# Patient Record
Sex: FEMALE | Employment: OTHER | URBAN - METROPOLITAN AREA
[De-identification: names, ages, dates, MRNs, and addresses within clinical notes are randomized per-mention and may not be internally consistent; named-entity substitution may affect disease eponyms.]

---

## 2018-07-17 ENCOUNTER — TRANSFERRED RECORDS (OUTPATIENT)
Dept: HEALTH INFORMATION MANAGEMENT | Facility: CLINIC | Age: 43
End: 2018-07-17

## 2018-12-18 ENCOUNTER — TRANSFERRED RECORDS (OUTPATIENT)
Dept: HEALTH INFORMATION MANAGEMENT | Facility: CLINIC | Age: 43
End: 2018-12-18

## 2019-01-10 DIAGNOSIS — R31.9 HEMATURIA: Primary | ICD-10-CM

## 2019-01-14 ENCOUNTER — OFFICE VISIT (OUTPATIENT)
Dept: UROLOGY | Facility: CLINIC | Age: 44
End: 2019-01-14
Payer: COMMERCIAL

## 2019-01-14 VITALS
HEIGHT: 62 IN | WEIGHT: 192 LBS | OXYGEN SATURATION: 99 % | DIASTOLIC BLOOD PRESSURE: 76 MMHG | SYSTOLIC BLOOD PRESSURE: 110 MMHG | BODY MASS INDEX: 35.33 KG/M2 | HEART RATE: 104 BPM

## 2019-01-14 DIAGNOSIS — R31.9 HEMATURIA, UNSPECIFIED TYPE: ICD-10-CM

## 2019-01-14 LAB
ALBUMIN UR-MCNC: NEGATIVE MG/DL
APPEARANCE UR: CLEAR
BACTERIA #/AREA URNS HPF: ABNORMAL /HPF
BILIRUB UR QL STRIP: NEGATIVE
COLOR UR AUTO: YELLOW
GLUCOSE UR STRIP-MCNC: NEGATIVE MG/DL
HGB UR QL STRIP: ABNORMAL
KETONES UR STRIP-MCNC: NEGATIVE MG/DL
LEUKOCYTE ESTERASE UR QL STRIP: NEGATIVE
NITRATE UR QL: NEGATIVE
PH UR STRIP: 7 PH (ref 5–7)
RBC #/AREA URNS AUTO: 1 /HPF (ref 0–2)
SOURCE: ABNORMAL
SP GR UR STRIP: 1.01 (ref 1–1.03)
SQUAMOUS #/AREA URNS AUTO: <1 /HPF (ref 0–1)
UROBILINOGEN UR STRIP-ACNC: 0.2 EU/DL (ref 0.2–1)
WBC #/AREA URNS AUTO: <1 /HPF (ref 0–5)

## 2019-01-14 PROCEDURE — 81015 MICROSCOPIC EXAM OF URINE: CPT | Performed by: PHYSICIAN ASSISTANT

## 2019-01-14 PROCEDURE — 99243 OFF/OP CNSLTJ NEW/EST LOW 30: CPT | Performed by: PHYSICIAN ASSISTANT

## 2019-01-14 PROCEDURE — 81003 URINALYSIS AUTO W/O SCOPE: CPT | Performed by: PHYSICIAN ASSISTANT

## 2019-01-14 RX ORDER — UBIDECARENONE 75 MG
100 CAPSULE ORAL DAILY
COMMUNITY

## 2019-01-14 ASSESSMENT — PAIN SCALES - GENERAL: PAINLEVEL: NO PAIN (0)

## 2019-01-14 ASSESSMENT — MIFFLIN-ST. JEOR: SCORE: 1476.22

## 2019-01-14 NOTE — PROGRESS NOTES
CC: Hematuria.    HPI: It is a pleasure to see Ms. Luca Messer, a 43 year old female, asked to be seen in consultation by Dr. Shah for evaluation of hematuria. This was first detected July 2017 on dipstick (1+).  Has had repeat UA and Micros x 3 (all in normal range 0-2, 0-1, 0-1).    The patient denies any gross hematuria.  Ms. Messer voids without difficulty (noticing some frequency and urgency since she started drinking cranberry juice this past month), and reports nocturia of 2-3.  She currently denies any dysuria, pyuria, hesitancy, intermittency, feelings of incomplete emptying, or any recent history of urinary tract infections or stones.    Hematuria Risk Factors:  Age >40: Yes     Smoking history: no  Occupational exposure to chemicals or dyes (ie, benzenes, aromatic amines): no  History of urologic disorder or disease: no  History of irritative voiding symptoms: no  History of urinary tract infection: no  Analgesic abuse: no  History of pelvic irradiation: no    Past Medical History:   Diagnosis Date     Thyroid disease     hypothyroid     History reviewed. No pertinent surgical history.  Current Outpatient Medications   Medication Sig Dispense Refill     Cholecalciferol (VITAMIN D3) 1000 units CAPS Take two capsules daily       Lactobacillus (CVS PROBIOTIC ACIDOPHILUS) 10 MG CAPS Take 1 capsule by mouth       levothyroxine (SYNTHROID, LEVOTHROID) 112 MCG tablet Take 112 mcg by mouth daily.       vitamin B-12 (CYANOCOBALAMIN) 100 MCG tablet Take 100 mcg by mouth daily       Prenatal Vit w/Vk-Adpifjepx-UA (PNV PO) Take 1 tablet by mouth daily.       No Known Allergies  FAMILY HISTORY: There is no reported history of genitourinary carcinoma.  There is no history of urolithiasis.      SOCIAL HISTORY: The patient does not smoke cigarettes. Denies EtOH and illicit drug abuse.      ROS: A comprehensive 14 point ROS was obtained and negative except for that outlined above in the HPI.    PHYSICAL EXAM:  "  Vitals:    01/14/19 1005   BP: 110/76   BP Location: Left arm   Patient Position: Sitting   Cuff Size: Adult Regular   Pulse: 104   SpO2: 99%   Weight: 87.1 kg (192 lb)   Height: 1.562 m (5' 1.5\")     GEN: NAD  EYES: EOMI  MOUTH: MMM  NECK: Supple  RESP: Unlabored breathing  CARDIAC: No LE edema  SKIN: Warm  ABD: soft  NEURO: AAO      Admission on 10/08/2011, Discharged on 10/10/2011   Component Date Value Ref Range Status     Treponema pallidum Antibody 10/08/2011 non reactive   Final     Group B Strep PCR 10/08/2011 negative   Final     Hep B Surface Agn 10/08/2011 neg   Final     HIV 1&2 Antibody 10/08/2011 neg   Final     Rubella IDALIA IgG 10/08/2011 immune   Final     Hemoglobin 10/09/2011 11.7  11.7 - 15.7 g/dL Final       IMAGING: None other than CT chest 2017    ASSESSMENT and PLAN:    43 year old female with hematuria on UA, microscopic exams normal.    -UA/micro today  -Reviewed full urologic work up if today's micro >2 RBCs/HPF.   -Currently does not meet criteria for urologic work up.   -Limit bladder irritants, stop cranberry juice. Limit fluids after 5pm.     Thank you for allowing me to participate in Ms. Messer's care. I will keep you updated of her progress, but please do not hesitate to contact me with any questions.    Amber Woo PA-C  [facility]  30 minutes were spent with the patient today, > 50% in counseling and coordination of care of hematuria.    "

## 2019-01-14 NOTE — LETTER
1/14/2019       RE: Luca Messer  [address] [facility] [address]     Dear Colleague,    Thank you for referring your patient, Luca Messer, to the [facility] [place] at [facility]. Please see a copy of my visit note below.    CC: Hematuria.    HPI: It is a pleasure to see Ms. Luca Messer, a 43 year old female, asked to be seen in consultation by Dr. Shah for evaluation of hematuria. This was first detected July 2017 on dipstick (1+).  Has had repeat UA and Micros x 3 (all in normal range 0-2, 0-1, 0-1).    The patient denies any gross hematuria.  Ms. Messer voids without difficulty (noticing some frequency and urgency since she started drinking cranberry juice this past month), and reports nocturia of 2-3.  She currently denies any dysuria, pyuria, hesitancy, intermittency, feelings of incomplete emptying, or any recent history of urinary tract infections or stones.    Hematuria Risk Factors:  Age >40: Yes     Smoking history: no  Occupational exposure to chemicals or dyes (ie, benzenes, aromatic amines): no  History of urologic disorder or disease: no  History of irritative voiding symptoms: no  History of urinary tract infection: no  Analgesic abuse: no  History of pelvic irradiation: no    Past Medical History:   Diagnosis Date     Thyroid disease     hypothyroid     History reviewed. No pertinent surgical history.  Current Outpatient Medications   Medication Sig Dispense Refill     Cholecalciferol (VITAMIN D3) 1000 units CAPS Take two capsules daily       Lactobacillus (CVS PROBIOTIC ACIDOPHILUS) 10 MG CAPS Take 1 capsule by mouth       levothyroxine (SYNTHROID, LEVOTHROID) 112 MCG tablet Take 112 mcg by mouth daily.       vitamin B-12 (CYANOCOBALAMIN) 100 MCG tablet Take 100 mcg by mouth daily       Prenatal Vit w/Qk-Ploffxmgp-VX (PNV PO) Take 1 tablet by mouth daily.       No Known Allergies  FAMILY HISTORY: There is no  "reported history of genitourinary carcinoma.  There is no history of urolithiasis.      SOCIAL HISTORY: The patient does not smoke cigarettes. Denies EtOH and illicit drug abuse.      ROS: A comprehensive 14 point ROS was obtained and negative except for that outlined above in the HPI.    PHYSICAL EXAM:   Vitals:    01/14/19 1005   BP: 110/76   BP Location: Left arm   Patient Position: Sitting   Cuff Size: Adult Regular   Pulse: 104   SpO2: 99%   Weight: 87.1 kg (192 lb)   Height: 1.562 m (5' 1.5\")     GEN: NAD  EYES: EOMI  MOUTH: MMM  NECK: Supple  RESP: Unlabored breathing  CARDIAC: No LE edema  SKIN: Warm  ABD: soft  NEURO: AAO      Admission on 10/08/2011, Discharged on 10/10/2011   Component Date Value Ref Range Status     Treponema pallidum Antibody 10/08/2011 non reactive   Final     Group B Strep PCR 10/08/2011 negative   Final     Hep B Surface Agn 10/08/2011 neg   Final     HIV 1&2 Antibody 10/08/2011 neg   Final     Rubella IDALIA IgG 10/08/2011 immune   Final     Hemoglobin 10/09/2011 11.7  11.7 - 15.7 g/dL Final       IMAGING: None other than CT chest 2017    ASSESSMENT and PLAN:    43 year old female with hematuria on UA, microscopic exams normal.    -UA/micro today  -Reviewed full urologic work up if today's micro >2 RBCs/HPF.   -Currently does not meet criteria for urologic work up.   -Limit bladder irritants, stop cranberry juice. Limit fluids after 5pm.     Thank you for allowing me to participate in Ms. Messer's care. I will keep you updated of her progress, but please do not hesitate to contact me with any questions.    Amber Woo PA-C  [facility]  30 minutes were spent with the patient today, > 50% in counseling and coordination of care of hematuria.  "

## 2019-01-14 NOTE — PATIENT INSTRUCTIONS
Below is a list of things that can irritate the bladder and should be avoided:      Caffeinated soft drinks.    Coffee.    Tea.    Chocolate.    Tomato-based foods.    Acidic juices and fruits. (includes cranberry juice)    Alcohol.    Carbonated drinks.    Aspartame/Nutrasweet.    Limit fluids after 5pm.     If today's urine shows >2 red cells, then we should do the following:  - Urine cytology to look for abnormal cells.  - CT scan  - Cystoscopy with the  urologist to evaluate the interior of the bladder.

## 2019-01-14 NOTE — NURSING NOTE
Chief Complaint   Patient presents with     Clinic Care Coordination - Follow-up     Pt here for microhematuria     Kat Her CMA

## 2021-06-05 ENCOUNTER — HEALTH MAINTENANCE LETTER (OUTPATIENT)
Age: 46
End: 2021-06-05

## 2021-09-25 ENCOUNTER — HEALTH MAINTENANCE LETTER (OUTPATIENT)
Age: 46
End: 2021-09-25

## 2021-12-25 ENCOUNTER — NURSE TRIAGE (OUTPATIENT)
Dept: NURSING | Facility: CLINIC | Age: 46
End: 2021-12-25
Payer: COMMERCIAL

## 2021-12-25 ENCOUNTER — HOSPITAL ENCOUNTER (EMERGENCY)
Facility: CLINIC | Age: 46
Discharge: HOME | End: 2021-12-25
Attending: EMERGENCY MEDICINE | Admitting: EMERGENCY MEDICINE
Payer: COMMERCIAL

## 2021-12-25 ENCOUNTER — APPOINTMENT (OUTPATIENT)
Dept: GENERAL RADIOLOGY | Facility: CLINIC | Age: 46
End: 2021-12-25
Attending: EMERGENCY MEDICINE
Payer: COMMERCIAL

## 2021-12-25 VITALS
SYSTOLIC BLOOD PRESSURE: 108 MMHG | RESPIRATION RATE: 16 BRPM | HEART RATE: 79 BPM | DIASTOLIC BLOOD PRESSURE: 82 MMHG | TEMPERATURE: 100 F | OXYGEN SATURATION: 100 %

## 2021-12-25 DIAGNOSIS — S30.0XXA CONTUSION OF COCCYX, INITIAL ENCOUNTER: ICD-10-CM

## 2021-12-25 DIAGNOSIS — S00.03XA CONTUSION OF SCALP, INITIAL ENCOUNTER: ICD-10-CM

## 2021-12-25 DIAGNOSIS — R55 SYNCOPE, UNSPECIFIED SYNCOPE TYPE: ICD-10-CM

## 2021-12-25 LAB
ANION GAP SERPL CALCULATED.3IONS-SCNC: 6 MMOL/L (ref 3–14)
BASOPHILS # BLD AUTO: 0 10E3/UL (ref 0–0.2)
BASOPHILS NFR BLD AUTO: 0 %
BUN SERPL-MCNC: 8 MG/DL (ref 7–30)
CALCIUM SERPL-MCNC: 8.5 MG/DL (ref 8.5–10.1)
CHLORIDE BLD-SCNC: 106 MMOL/L (ref 94–109)
CO2 SERPL-SCNC: 26 MMOL/L (ref 20–32)
CREAT SERPL-MCNC: 0.59 MG/DL (ref 0.52–1.04)
DEPRECATED S PYO AG THROAT QL EIA: NEGATIVE
EOSINOPHIL # BLD AUTO: 0 10E3/UL (ref 0–0.7)
EOSINOPHIL NFR BLD AUTO: 0 %
ERYTHROCYTE [DISTWIDTH] IN BLOOD BY AUTOMATED COUNT: 12.5 % (ref 10–15)
GFR SERPL CREATININE-BSD FRML MDRD: >90 ML/MIN/1.73M2
GLUCOSE BLD-MCNC: 111 MG/DL (ref 70–99)
GROUP A STREP BY PCR: NOT DETECTED
HCT VFR BLD AUTO: 40.9 % (ref 35–47)
HGB BLD-MCNC: 13.6 G/DL (ref 11.7–15.7)
HOLD SPECIMEN: NORMAL
HOLD SPECIMEN: NORMAL
IMM GRANULOCYTES # BLD: 0 10E3/UL
IMM GRANULOCYTES NFR BLD: 0 %
LYMPHOCYTES # BLD AUTO: 1 10E3/UL (ref 0.8–5.3)
LYMPHOCYTES NFR BLD AUTO: 12 %
MAGNESIUM SERPL-MCNC: 1.8 MG/DL (ref 1.6–2.3)
MCH RBC QN AUTO: 29.4 PG (ref 26.5–33)
MCHC RBC AUTO-ENTMCNC: 33.3 G/DL (ref 31.5–36.5)
MCV RBC AUTO: 88 FL (ref 78–100)
MONOCYTES # BLD AUTO: 0.8 10E3/UL (ref 0–1.3)
MONOCYTES NFR BLD AUTO: 10 %
NEUTROPHILS # BLD AUTO: 6.4 10E3/UL (ref 1.6–8.3)
NEUTROPHILS NFR BLD AUTO: 78 %
NRBC # BLD AUTO: 0 10E3/UL
NRBC BLD AUTO-RTO: 0 /100
PLATELET # BLD AUTO: 228 10E3/UL (ref 150–450)
POTASSIUM BLD-SCNC: 3.8 MMOL/L (ref 3.4–5.3)
RBC # BLD AUTO: 4.63 10E6/UL (ref 3.8–5.2)
SODIUM SERPL-SCNC: 138 MMOL/L (ref 133–144)
TROPONIN I SERPL HS-MCNC: 4 NG/L
WBC # BLD AUTO: 8.2 10E3/UL (ref 4–11)

## 2021-12-25 PROCEDURE — 250N000013 HC RX MED GY IP 250 OP 250 PS 637: Performed by: EMERGENCY MEDICINE

## 2021-12-25 PROCEDURE — 82310 ASSAY OF CALCIUM: CPT | Performed by: EMERGENCY MEDICINE

## 2021-12-25 PROCEDURE — 87651 STREP A DNA AMP PROBE: CPT | Performed by: EMERGENCY MEDICINE

## 2021-12-25 PROCEDURE — 96360 HYDRATION IV INFUSION INIT: CPT

## 2021-12-25 PROCEDURE — 36415 COLL VENOUS BLD VENIPUNCTURE: CPT | Performed by: EMERGENCY MEDICINE

## 2021-12-25 PROCEDURE — 99285 EMERGENCY DEPT VISIT HI MDM: CPT | Mod: 25

## 2021-12-25 PROCEDURE — 96361 HYDRATE IV INFUSION ADD-ON: CPT

## 2021-12-25 PROCEDURE — 93005 ELECTROCARDIOGRAM TRACING: CPT

## 2021-12-25 PROCEDURE — 83735 ASSAY OF MAGNESIUM: CPT | Performed by: EMERGENCY MEDICINE

## 2021-12-25 PROCEDURE — 258N000003 HC RX IP 258 OP 636: Performed by: EMERGENCY MEDICINE

## 2021-12-25 PROCEDURE — 85025 COMPLETE CBC W/AUTO DIFF WBC: CPT | Performed by: EMERGENCY MEDICINE

## 2021-12-25 PROCEDURE — 71046 X-RAY EXAM CHEST 2 VIEWS: CPT

## 2021-12-25 PROCEDURE — 84484 ASSAY OF TROPONIN QUANT: CPT | Performed by: EMERGENCY MEDICINE

## 2021-12-25 RX ORDER — ACETAMINOPHEN 500 MG
1000 TABLET ORAL ONCE
Status: COMPLETED | OUTPATIENT
Start: 2021-12-25 | End: 2021-12-25

## 2021-12-25 RX ADMIN — SODIUM CHLORIDE 1000 ML: 9 INJECTION, SOLUTION INTRAVENOUS at 02:11

## 2021-12-25 RX ADMIN — ACETAMINOPHEN 1000 MG: 500 TABLET, FILM COATED ORAL at 02:10

## 2021-12-25 ASSESSMENT — ENCOUNTER SYMPTOMS
VOMITING: 0
NAUSEA: 1
DIZZINESS: 1
SORE THROAT: 1

## 2021-12-25 NOTE — ED TRIAGE NOTES
"Pt got covid booster last night. Had fever and body aches. Pt \"passed out\" around 22:00. Pt hit back of head when she fell and LOC for approx 1 min. Pt nauseated initially.   "

## 2021-12-25 NOTE — ED PROVIDER NOTES
History     Chief Complaint:  Syncope    The history is provided by the patient and the spouse.      Luca Messer is a 46 year old female with history of who presents with syncope. The patient recounts receiving her Covid booster at around 2015 on 12/23/21. Then at around 2200 on 12/24/21 she had a syncopal episode, falling and hitting the back of her head against drywall; witnessed by . This has never happened before. The patient recounts some nausea prior to fainting, but denies any vomiting. She remembers experiencing some dizziness after waking up, but felt otherwise well, and this dizziness has since subsided. As she presents to the ED, she complains of some pain to her bottom. She also mentions throat soreness starting last night.     Review of Systems   HENT: Positive for sore throat.    Gastrointestinal: Positive for nausea. Negative for vomiting.   Neurological: Positive for dizziness and syncope.   All other systems reviewed and are negative.      Allergies:  The patient has no known allergies.    Medications:    Lactobacillus  Synthroid    Past Medical History:    Hypothyroidism  Obesity  Fecal abnormalities   Hypertrophy of tonsils  GERD  Vitamin D deficiency  Vitamin B deficiency    Gallbladder stones     Family History:    Father: pancreatic cancer, type II diabetes  Mother: type II diabetes, hypertension, kidney failure     Social History:  The patient presents to the ED with her .     Physical Exam     Patient Vitals for the past 24 hrs:   BP Temp Temp src Pulse Resp SpO2   12/25/21 0500 105/72 -- -- 75 -- 97 %   12/25/21 0450 117/81 -- -- 77 -- 95 %   12/25/21 0300 99/62 -- -- 84 -- 99 %   12/25/21 0245 100/60 -- -- 71 -- 99 %   12/25/21 0230 106/74 -- -- 85 -- 100 %   12/25/21 0200 108/77 -- -- 93 -- 97 %   12/25/21 0154 117/86 -- -- 104 -- 99 %   12/25/21 0100 (!) 141/101 100  F (37.8  C) Temporal 105 16 99 %       Physical Exam  Constitutional: Vital signs reviewed as above.    Head: No external signs of trauma noted.  Eyes: Pupils are equal, round, and reactive to light.   Oropharynx: MMM mild tonsillar erythema with minimal exudate on the left.  Neck: No JVD noted  Cardiovascular: Normal rate at the time of my exam, regular rhythm and normal heart sounds.  No murmur heard. Equal B/L peripheral pulses.  Pulmonary/Chest: Effort normal and breath sounds normal. No respiratory distress. Patient has no wheezes. Patient has no rales.   Gastrointestinal: Soft. There is no tenderness.   Musculoskeletal/Extremities: No edema noted. Normal tone. No midline L-spine tenderness. There appears to be lower sacral/coccygeal tenderness.  Neurological: Patient is alert and oriented to person, place, and time. No gross deficits noted.  Skin: Skin is warm and dry. There is no diaphoresis noted.  No scalp laceration noted.  Psychiatric: The patient appears calm.    Emergency Department Course   ECG:  ECG taken at 0109, ECG read at 0136  Normal sinus rhythm. Nonspecific ST and T wave abnormality. Abnormal ECG.    No prior EKG to compare.   Rate 97 bpm. OK interval 142 ms. QRS duration 82 ms. QT/QTc 340/431 ms. P-R-T axes 46 4 47.     Imaging:  XR chest PA & LAT  Negative chest.  As per radiology.     Laboratory:  CBC: WBC 8.2, HGB 13.6,      BMP: glucose: 111(H) o/w WNL (Creatinine 0.59)     Troponin (Collected 0159): 4    Magnesium: 1.8    Streptococcus A PCR: Negative    Streptococcus A Swab: PENDING    Procedures:      Emergency Department Course:       Reviewed:  I reviewed nursing notes, vitals, past history and care everywhere    Assessments/Consults:   ED Course as of 12/25/21 0549   Sat Dec 25, 2021   0140 I obtained history and examined the patient as noted above    0502 I rechecked the patient and explained findings     Interventions:  0210 Tylenol 1,000 mg PO   0211 NS 1 L IV     Disposition:  The patient was discharged to home.    Impression & Plan         Medical Decision Making:    This  46-year-old female patient presents to the ED due to syncope.  Please see the HPI and exam for specifics.  Fortunately, the patient has remained well.  A broad differential was considered.  Head CT imaging was not necessary based on the [place] head CT rules.  Chest x-ray was negative.  Patient declined tailbone x-ray.    Patient remained stable in the ED.  EKG was reassuring and the patient is low risk per the [place] syncope rule.  I believe she can be discharged.  Her etiology is most likely vasovagal.  She should follow-up in the outpatient setting and return with any new or worsening symptoms.  Anticipatory guidance given to patient and  prior to discharge.        Critical Care time:  none    Covid-19  Luca Messer was evaluated during a global COVID-19 pandemic, which necessitated consideration that the patient might be at risk for infection with the SARS-CoV-2 virus that causes COVID-19.   Applicable protocols for evaluation were followed during the patient's care.     Diagnosis:    ICD-10-CM    1. Syncope, unspecified syncope type  R55    2. Contusion of coccyx, initial encounter  S30.0XXA    3. Contusion of scalp, initial encounter  S00.03XA        Discharge Medications:  New Prescriptions    No medications on file       Scribe Disclosure:  Rolf WALDEN, am serving as a scribe at 1:30 AM on 12/25/2021 to document services personally performed by Rufino Davis DO based on my observations and the provider's statements to me.      Rufino Davis DO  12/25/21 0549

## 2021-12-25 NOTE — TELEPHONE ENCOUNTER
Pt had COVID booster yesterday Moderna   Pt is now running a temp 99.6  Having sore throat   Nausea    states that pt passed out when she stood up  Pt is breathing ok and talking    Per protocol = Go to ED now      will be driving pt to ED now     Care advice given per protocol and when to call back. Pt verbalized understanding and agrees to plan of care.    Hilda Vargas RN  [place] Nurse Advisor  12:09 AM 12/25/2021      COVID 19 Nurse Triage Plan/Patient Instructions    Please be aware that novel coronavirus (COVID-19) may be circulating in the community. If you develop symptoms such as fever, cough, or SOB or if you have concerns about the presence of another infection including coronavirus (COVID-19), please contact your health care provider or visit [email or web address].     Disposition/Instructions    ED Visit recommended. Follow protocol based instructions.     Bring Your Own Device:  Please also bring your smart device(s) (smart phones, tablets, laptops) and their charging cables for your personal use and to communicate with your care team during your visit.    Thank you for taking steps to prevent the spread of this virus.  o Limit your contact with others.  o Wear a simple mask to cover your cough.  o Wash your hands well and often.    Resources    [facility]: About COVID-19: [email or web address]    CDC: What to Do If You're Sick: www.cdc.gov/coronavirus/2019-ncov/about/steps-when-sick.html    CDC: Ending Home Isolation: www.cdc.gov/coronavirus/2019-ncov/hcp/disposition-in-home-patients.html     CDC: Caring for Someone: www.cdc.gov/coronavirus/2019-ncov/if-you-are-sick/care-for-someone.html     [facility]: Interim Guidance for Hospital Discharge to Home: www.health.[facility].[place]/diseases/coronavirus/hcp/hospdischarge.pdf    [facility] clinical trials (COVID-19 research studies): [email or web address]     Below are the COVID-19  hotlines at the [place] Department of [employer] ([facility]). Interpreters are available.   o For health questions: Call 505-706-6725 or 1-166.905.9600 (7 a.m. to 7 p.m.)  o For questions about schools and childcare: Call 378-391-2115 or 1-985.263.3437 (7 a.m. to 7 p.m.)                     Reason for Disposition    Sounds like a severe, unusual reaction to the triager    Additional Information    Negative: [1] Difficulty breathing or swallowing AND [2] starts within 2 hours after injection    Negative: Sounds like a life-threatening emergency to the triager    Negative: [1] Symptoms of COVID-19 (e.g., cough, fever, SOB, or others) AND [2] within 14 days of EXPOSURE (close contact) with diagnosed or suspected COVID-19 patient    Negative: [1] Symptoms of COVID-19 (e.g., cough, fever, SOB, or others) AND [2] within 14 days of being at a crowded indoor or outdoor event (e.g., concert, festival, rally, wedding)    Negative: Typical COVID-19 symptoms (e.g., cough, difficulty breathing, loss of taste or smell, runny nose, sore throat) that are NOT expected from vaccine    Negative: [1] COVID-19 exposure AND [2] no symptoms, or symptoms not typical of COVID-19    Negative: Fever > 104 F (40 C)    Protocols used: CORONAVIRUS (COVID-19) VACCINE QUESTIONS AND NYIGSHUEG-I-[place] 8.25.2021

## 2021-12-28 LAB
ATRIAL RATE - MUSE: 97 BPM
DIASTOLIC BLOOD PRESSURE - MUSE: NORMAL MMHG
INTERPRETATION ECG - MUSE: NORMAL
P AXIS - MUSE: 46 DEGREES
PR INTERVAL - MUSE: 142 MS
QRS DURATION - MUSE: 82 MS
QT - MUSE: 340 MS
QTC - MUSE: 431 MS
R AXIS - MUSE: 4 DEGREES
SYSTOLIC BLOOD PRESSURE - MUSE: NORMAL MMHG
T AXIS - MUSE: 47 DEGREES
VENTRICULAR RATE- MUSE: 97 BPM

## 2022-07-02 ENCOUNTER — HEALTH MAINTENANCE LETTER (OUTPATIENT)
Age: 47
End: 2022-07-02

## 2023-04-22 ENCOUNTER — HEALTH MAINTENANCE LETTER (OUTPATIENT)
Age: 48
End: 2023-04-22

## 2024-02-10 ENCOUNTER — HEALTH MAINTENANCE LETTER (OUTPATIENT)
Age: 49
End: 2024-02-10

## 2024-06-29 ENCOUNTER — HEALTH MAINTENANCE LETTER (OUTPATIENT)
Age: 49
End: 2024-06-29

## 2024-12-20 ENCOUNTER — ANCILLARY PROCEDURE (OUTPATIENT)
Dept: ULTRASOUND IMAGING | Facility: CLINIC | Age: 49
End: 2024-12-20
Attending: INTERNAL MEDICINE
Payer: COMMERCIAL

## 2024-12-20 DIAGNOSIS — K21.9 GERD WITHOUT ESOPHAGITIS: ICD-10-CM

## 2024-12-20 DIAGNOSIS — R10.11 RIGHT UPPER QUADRANT PAIN: ICD-10-CM

## 2024-12-20 PROCEDURE — 76700 US EXAM ABDOM COMPLETE: CPT | Mod: GC | Performed by: STUDENT IN AN ORGANIZED HEALTH CARE EDUCATION/TRAINING PROGRAM

## 2024-12-30 ENCOUNTER — HOSPITAL ENCOUNTER (OUTPATIENT)
Dept: NUCLEAR MEDICINE | Facility: CLINIC | Age: 49
Setting detail: NUCLEAR MEDICINE
Discharge: HOME | End: 2024-12-30
Attending: INTERNAL MEDICINE | Admitting: INTERNAL MEDICINE
Payer: COMMERCIAL

## 2024-12-30 DIAGNOSIS — R10.11 RIGHT UPPER QUADRANT ABDOMINAL PAIN: ICD-10-CM

## 2024-12-30 PROCEDURE — 78227 HEPATOBIL SYST IMAGE W/DRUG: CPT

## 2024-12-30 PROCEDURE — 78227 HEPATOBIL SYST IMAGE W/DRUG: CPT | Mod: 26 | Performed by: RADIOLOGY

## 2024-12-30 PROCEDURE — A9537 TC99M MEBROFENIN: HCPCS | Performed by: INTERNAL MEDICINE

## 2024-12-30 PROCEDURE — 343N000001 HC RX 343 MED OP 636: Performed by: INTERNAL MEDICINE

## 2024-12-30 RX ORDER — KIT FOR THE PREPARATION OF TECHNETIUM TC 99M MEBROFENIN 45 MG/10ML
6.3 INJECTION, POWDER, LYOPHILIZED, FOR SOLUTION INTRAVENOUS ONCE
Status: COMPLETED | OUTPATIENT
Start: 2024-12-30 | End: 2024-12-30

## 2024-12-30 RX ADMIN — MEBROFENIN 6.3 MILLICURIE: 45 INJECTION, POWDER, LYOPHILIZED, FOR SOLUTION INTRAVENOUS at 11:14

## 2025-02-08 ENCOUNTER — HEALTH MAINTENANCE LETTER (OUTPATIENT)
Age: 50
End: 2025-02-08